# Patient Record
Sex: MALE | Race: WHITE | URBAN - METROPOLITAN AREA
[De-identification: names, ages, dates, MRNs, and addresses within clinical notes are randomized per-mention and may not be internally consistent; named-entity substitution may affect disease eponyms.]

---

## 2021-08-03 ENCOUNTER — LAB ENCOUNTER (OUTPATIENT)
Dept: LAB | Age: 36
End: 2021-08-03
Attending: INTERNAL MEDICINE
Payer: COMMERCIAL

## 2021-08-03 ENCOUNTER — OFFICE VISIT (OUTPATIENT)
Dept: INTERNAL MEDICINE CLINIC | Facility: CLINIC | Age: 36
End: 2021-08-03
Payer: COMMERCIAL

## 2021-08-03 VITALS
HEART RATE: 68 BPM | HEIGHT: 75.5 IN | TEMPERATURE: 99 F | BODY MASS INDEX: 30.02 KG/M2 | WEIGHT: 244 LBS | SYSTOLIC BLOOD PRESSURE: 118 MMHG | OXYGEN SATURATION: 98 % | DIASTOLIC BLOOD PRESSURE: 86 MMHG

## 2021-08-03 DIAGNOSIS — Z30.09 VASECTOMY EVALUATION: ICD-10-CM

## 2021-08-03 DIAGNOSIS — Z00.00 PHYSICAL EXAM: ICD-10-CM

## 2021-08-03 DIAGNOSIS — Z00.00 PHYSICAL EXAM: Primary | ICD-10-CM

## 2021-08-03 LAB
ALBUMIN SERPL-MCNC: 4.1 G/DL (ref 3.4–5)
ALBUMIN/GLOB SERPL: 1.3 {RATIO} (ref 1–2)
ALP LIVER SERPL-CCNC: 67 U/L
ALT SERPL-CCNC: 30 U/L
ANION GAP SERPL CALC-SCNC: 10 MMOL/L (ref 0–18)
AST SERPL-CCNC: 17 U/L (ref 15–37)
BASOPHILS # BLD AUTO: 0.06 X10(3) UL (ref 0–0.2)
BASOPHILS NFR BLD AUTO: 0.8 %
BILIRUB SERPL-MCNC: 0.8 MG/DL (ref 0.1–2)
BILIRUB UR QL: NEGATIVE
BUN BLD-MCNC: 19 MG/DL (ref 7–18)
BUN/CREAT SERPL: 22.4 (ref 10–20)
CALCIUM BLD-MCNC: 9.6 MG/DL (ref 8.5–10.1)
CHLORIDE SERPL-SCNC: 105 MMOL/L (ref 98–112)
CHOLEST SMN-MCNC: 153 MG/DL (ref ?–200)
CLARITY UR: CLEAR
CO2 SERPL-SCNC: 25 MMOL/L (ref 21–32)
COLOR UR: YELLOW
CREAT BLD-MCNC: 0.85 MG/DL
DEPRECATED RDW RBC AUTO: 38.3 FL (ref 35.1–46.3)
EOSINOPHIL # BLD AUTO: 0.18 X10(3) UL (ref 0–0.7)
EOSINOPHIL NFR BLD AUTO: 2.5 %
ERYTHROCYTE [DISTWIDTH] IN BLOOD BY AUTOMATED COUNT: 11.9 % (ref 11–15)
GLOBULIN PLAS-MCNC: 3.2 G/DL (ref 2.8–4.4)
GLUCOSE BLD-MCNC: 84 MG/DL (ref 70–99)
GLUCOSE UR-MCNC: NEGATIVE MG/DL
HCT VFR BLD AUTO: 46 %
HDLC SERPL-MCNC: 57 MG/DL (ref 40–59)
HGB BLD-MCNC: 15.6 G/DL
IMM GRANULOCYTES # BLD AUTO: 0.05 X10(3) UL (ref 0–1)
IMM GRANULOCYTES NFR BLD: 0.7 %
KETONES UR-MCNC: NEGATIVE MG/DL
LDLC SERPL CALC-MCNC: 83 MG/DL (ref ?–100)
LEUKOCYTE ESTERASE UR QL STRIP.AUTO: NEGATIVE
LYMPHOCYTES # BLD AUTO: 1.78 X10(3) UL (ref 1–4)
LYMPHOCYTES NFR BLD AUTO: 24.4 %
M PROTEIN MFR SERPL ELPH: 7.3 G/DL (ref 6.4–8.2)
MCH RBC QN AUTO: 29.8 PG (ref 26–34)
MCHC RBC AUTO-ENTMCNC: 33.9 G/DL (ref 31–37)
MCV RBC AUTO: 88 FL
MONOCYTES # BLD AUTO: 0.77 X10(3) UL (ref 0.1–1)
MONOCYTES NFR BLD AUTO: 10.6 %
NEUTROPHILS # BLD AUTO: 4.45 X10 (3) UL (ref 1.5–7.7)
NEUTROPHILS # BLD AUTO: 4.45 X10(3) UL (ref 1.5–7.7)
NEUTROPHILS NFR BLD AUTO: 61 %
NITRITE UR QL STRIP.AUTO: NEGATIVE
NONHDLC SERPL-MCNC: 96 MG/DL (ref ?–130)
OSMOLALITY SERPL CALC.SUM OF ELEC: 291 MOSM/KG (ref 275–295)
PATIENT FASTING Y/N/NP: YES
PATIENT FASTING Y/N/NP: YES
PH UR: 5 [PH] (ref 5–8)
PLATELET # BLD AUTO: 181 10(3)UL (ref 150–450)
POTASSIUM SERPL-SCNC: 4.3 MMOL/L (ref 3.5–5.1)
PROT UR-MCNC: NEGATIVE MG/DL
RBC # BLD AUTO: 5.23 X10(6)UL
SODIUM SERPL-SCNC: 140 MMOL/L (ref 136–145)
SP GR UR STRIP: 1.02 (ref 1–1.03)
TRIGL SERPL-MCNC: 63 MG/DL (ref 30–149)
TSI SER-ACNC: 1.62 MIU/ML (ref 0.36–3.74)
UROBILINOGEN UR STRIP-ACNC: <2
VIT D+METAB SERPL-MCNC: 70.8 NG/ML (ref 30–100)
VLDLC SERPL CALC-MCNC: 10 MG/DL (ref 0–30)
WBC # BLD AUTO: 7.3 X10(3) UL (ref 4–11)

## 2021-08-03 PROCEDURE — 36415 COLL VENOUS BLD VENIPUNCTURE: CPT

## 2021-08-03 PROCEDURE — 81001 URINALYSIS AUTO W/SCOPE: CPT | Performed by: INTERNAL MEDICINE

## 2021-08-03 PROCEDURE — 82306 VITAMIN D 25 HYDROXY: CPT

## 2021-08-03 PROCEDURE — 84402 ASSAY OF FREE TESTOSTERONE: CPT

## 2021-08-03 PROCEDURE — 85025 COMPLETE CBC W/AUTO DIFF WBC: CPT

## 2021-08-03 PROCEDURE — 3079F DIAST BP 80-89 MM HG: CPT | Performed by: INTERNAL MEDICINE

## 2021-08-03 PROCEDURE — 84403 ASSAY OF TOTAL TESTOSTERONE: CPT

## 2021-08-03 PROCEDURE — 80053 COMPREHEN METABOLIC PANEL: CPT

## 2021-08-03 PROCEDURE — 99385 PREV VISIT NEW AGE 18-39: CPT | Performed by: INTERNAL MEDICINE

## 2021-08-03 PROCEDURE — 3008F BODY MASS INDEX DOCD: CPT | Performed by: INTERNAL MEDICINE

## 2021-08-03 PROCEDURE — 80061 LIPID PANEL: CPT

## 2021-08-03 PROCEDURE — 84443 ASSAY THYROID STIM HORMONE: CPT

## 2021-08-03 PROCEDURE — 3074F SYST BP LT 130 MM HG: CPT | Performed by: INTERNAL MEDICINE

## 2021-08-03 NOTE — PROGRESS NOTES
Allie Lutz is a 43year old male who presents for a complete physical exam.   HPI:   Pt complains of:    Patient presents with:  Physical: Pt in good shape and doing 25min cardio with 1 hr of weight 3-4 times per week, eating habits have dropped off. weakness. negative for joint pain  Hema/Lymph: Negative Easy bleeding and easy bruising. Allergic/Immuno: Negative Environmental allergies and food allergies.     EXAM:   /86   Pulse 68   Temp 98.6 °F (37 °C) (Oral)   Ht 6' 3.5\" (1.918 m)   Wt 244 l exercise, review of available labs and radiology reports, and completing documentation.     Rachel Cardona DO  8/3/2021  3:48 PM

## 2021-08-03 NOTE — PATIENT INSTRUCTIONS
1. Physical exam  Physical exam instruction: Improve diet and exercise, complete fasting labs in the near future and you will be called with results 5-7 days after completed, call with questions.   Call the central scheduling number at [phone] to sched

## 2021-08-07 LAB
TESTOSTERONE, FREE, S: 9.77 NG/DL
TESTOSTERONE, TOTAL, S: 315 NG/DL

## 2021-08-10 ENCOUNTER — PATIENT MESSAGE (OUTPATIENT)
Dept: INTERNAL MEDICINE CLINIC | Facility: CLINIC | Age: 36
End: 2021-08-10

## 2021-08-10 DIAGNOSIS — R79.89 LOW TESTOSTERONE: Primary | ICD-10-CM

## 2021-08-11 NOTE — TELEPHONE ENCOUNTER
From: Caryn Villatoro  To: Dorinda Mcmahan DO  Sent: 8/10/2021 6:03 PM CDT  Subject: Test Results Question    Thanks for getting back to me. After receiving the results I do have some concerns regarding the testosterone level. Doing some research.  [facility]

## 2021-09-23 ENCOUNTER — PATIENT MESSAGE (OUTPATIENT)
Dept: SURGERY | Facility: CLINIC | Age: 36
End: 2021-09-23

## 2021-09-24 NOTE — TELEPHONE ENCOUNTER
Sent patient [facility] message. Spoke with Ct Tee, she said as of now there is openings in November.      Future Appointments   Date Time Provider Jaime Wan   9/29/2021  1:00 PM Laina Gamble MD [facility]

## 2021-09-24 NOTE — TELEPHONE ENCOUNTER
From: Chad Lazcano  To: Marissa Florez MD  Sent: 9/23/2021 12:32 PM CDT  Subject: Vasectomy    Hello. I have my consultation scheduled next week.  I was hoping to find out if there will be availability for me to have the actual vasectomy in November as

## 2021-09-29 ENCOUNTER — OFFICE VISIT (OUTPATIENT)
Dept: SURGERY | Facility: CLINIC | Age: 36
End: 2021-09-29
Payer: COMMERCIAL

## 2021-09-29 VITALS
WEIGHT: 257 LBS | HEART RATE: 58 BPM | SYSTOLIC BLOOD PRESSURE: 104 MMHG | HEIGHT: 75.5 IN | DIASTOLIC BLOOD PRESSURE: 62 MMHG | BODY MASS INDEX: 31.62 KG/M2

## 2021-09-29 DIAGNOSIS — R79.89 LOW TESTOSTERONE IN MALE: ICD-10-CM

## 2021-09-29 DIAGNOSIS — Z87.891 FORMER SMOKER: ICD-10-CM

## 2021-09-29 DIAGNOSIS — R35.0 URINARY FREQUENCY: ICD-10-CM

## 2021-09-29 DIAGNOSIS — Z30.09 VASECTOMY EVALUATION: Primary | ICD-10-CM

## 2021-09-29 PROCEDURE — 99244 OFF/OP CNSLTJ NEW/EST MOD 40: CPT | Performed by: UROLOGY

## 2021-09-29 PROCEDURE — 3008F BODY MASS INDEX DOCD: CPT | Performed by: UROLOGY

## 2021-09-29 PROCEDURE — 3074F SYST BP LT 130 MM HG: CPT | Performed by: UROLOGY

## 2021-09-29 PROCEDURE — 3078F DIAST BP <80 MM HG: CPT | Performed by: UROLOGY

## 2021-09-29 RX ORDER — DIAZEPAM 10 MG/1
TABLET ORAL
Qty: 1 TABLET | Refills: 0 | Status: SHIPPED | OUTPATIENT
Start: 2021-09-29

## 2021-09-29 RX ORDER — DOXYCYCLINE 100 MG/1
100 CAPSULE ORAL 2 TIMES DAILY
Qty: 10 CAPSULE | Refills: 1 | Status: SHIPPED | OUTPATIENT
Start: 2021-09-29

## 2021-09-29 RX ORDER — HYDROCODONE BITARTRATE AND ACETAMINOPHEN 5; 325 MG/1; MG/1
TABLET ORAL
Qty: 7 TABLET | Refills: 0 | Status: SHIPPED | OUTPATIENT
Start: 2021-09-29

## 2021-09-29 NOTE — PROGRESS NOTES
Patient seen in office, scheduled to have voluntary bilateral vasectomy Friday 12/31/2021 in office.  Patient informed to arrive here at 7:30am.

## 2021-09-29 NOTE — PATIENT INSTRUCTIONS
Luis Collins M.D.     1.  Please continue  birth control precautions without exception, every time you have intercourse,  until further notice    2.   Proceed with plans for voluntary sterilization -- bilateral to either the hospital or the surgery center. If you are scheduled for the office, then it will be performed in [address] [place] [address] which is on the second floor of the [place] [address].   You must be on time for your vasectomy; if you are late, you will have

## 2021-09-29 NOTE — PROGRESS NOTES
Ashok Michael is a 43year old male. HPI:   Patient presents with:  Vasectomy: Pt here for vasectomy consult      History provided by Patient.  Referred by Dr. Joseph Patrick, PCP      [facility]                 Patient is 26 to  and his w 10 pack year history      Review of previous records:   Urological History--This is the patient's first time seeing a urologist     Smoking History & Fume Exposure--Patient smoked 20 years, 1/2 pack a day; 10 pack year history    Family History--Mother had pain, constipation, decreased appetite, diarrhea and vomiting    Neurological:  Negative for gait disturbance    Endocrine:  Negative for abnormal sleep patterns, increased activity, polydipsia and polyphagia    Allergic/Immuno:  Negative for environmental Hydrocele or spermatocele above right testicle 2.5 - 3 cm   LENGTH OF VAS DEFERENS   Left mildly longer than Average;  Right vas deferens and spermatic cord also mildly longer than average.   Each vas deferens somewhat more mobile to palpation than typical, understands, that there is a possibility that after bilateral vasectomy, a patient may not achieve a sperm count of less than 100,000 nonmotile sperm cells per mL, and that if that were to happen, and if it persisted for longer period of time, the patient possible complications including possible bleeding, clot, infection,  nerve, anesthesia complications, persistent post-operative pain, possible injury to organs, possible failure of surgery or of surgical instruments or devices, possible need for additiona his hydrocodone.              2)  Prostate cancer screening/PSA screening  --   I strongly recommend to patient that he start annual prostate cancer PSA screening at age 54, unless if there is a family history of prostate cancer at a younger age, upon which procedure and none for the first 2 days after the procedure; it is best that you remove these medications from your medicine cabinet 10 days before the procedure so that you do not accidentally take them.   It is okay to take Tylenol or acetaminophen, but n is simply \"not fair\" to others.     13  If you wish, I can perform the vasectomy either at the office, or [address] on the first floor of the [facility],   depending on which of the two  is better for you with respect to your insuranc ARRIVE AT THE OFFICE   for your procedure; you must have a . After the procedure, take 1 tablet every  6 hours for pain.        Imaging & Referrals:  None     By signing my name below, I, Nitesh Villela,  attest that this documentation has been prepar

## 2021-12-14 ENCOUNTER — TELEPHONE (OUTPATIENT)
Dept: SURGERY | Facility: CLINIC | Age: 36
End: 2021-12-14

## 2021-12-14 NOTE — TELEPHONE ENCOUNTER
Patient indicates he has procedure scheduled on 12/31 (indicates exam). Patient moved and would like to reschedule procedure. Please call at [phone],[facility].

## 2022-02-28 ENCOUNTER — TELEPHONE (OUTPATIENT)
Dept: SURGERY | Facility: CLINIC | Age: 37
End: 2022-02-28

## 2022-02-28 RX ORDER — HYDROCODONE BITARTRATE AND ACETAMINOPHEN 5; 325 MG/1; MG/1
TABLET ORAL
Qty: 7 TABLET | Refills: 0 | Status: SHIPPED | OUTPATIENT
Start: 2022-02-28

## 2022-02-28 NOTE — TELEPHONE ENCOUNTER
Per pt he is coming in for surgery and the prescription for Norco . Per pt requesting a new prescription.  Please advise

## 2022-02-28 NOTE — TELEPHONE ENCOUNTER
Spoke with pt and notified him that norco has been sent. Pt states the other 2 prescriptions were still valid. Went over pre-procedure instructions agains with pt. Instructed pt to come in at 0730 Friday morning. Verbalized understanding.

## 2022-03-04 ENCOUNTER — PROCEDURE (OUTPATIENT)
Dept: SURGERY | Facility: CLINIC | Age: 37
End: 2022-03-04
Payer: COMMERCIAL

## 2022-03-04 VITALS
BODY MASS INDEX: 31.62 KG/M2 | DIASTOLIC BLOOD PRESSURE: 77 MMHG | HEIGHT: 75.5 IN | RESPIRATION RATE: 16 BRPM | WEIGHT: 257 LBS | SYSTOLIC BLOOD PRESSURE: 117 MMHG | HEART RATE: 64 BPM

## 2022-03-04 DIAGNOSIS — A63.0 CONDYLOMA ACUMINATUM OF PENIS: ICD-10-CM

## 2022-03-04 DIAGNOSIS — Z30.8 POSTVASECTOMY SPERM COUNT: ICD-10-CM

## 2022-03-04 DIAGNOSIS — Z30.2 ENCOUNTER FOR STERILIZATION: Primary | ICD-10-CM

## 2022-03-04 PROCEDURE — 55250 REMOVAL OF SPERM DUCT(S): CPT | Performed by: UROLOGY

## 2022-03-04 PROCEDURE — 99212 OFFICE O/P EST SF 10 MIN: CPT | Performed by: UROLOGY

## 2022-03-04 PROCEDURE — 99070 SPECIAL SUPPLIES PHYS/QHP: CPT | Performed by: UROLOGY

## 2022-03-04 PROCEDURE — 3008F BODY MASS INDEX DOCD: CPT | Performed by: UROLOGY

## 2022-03-04 PROCEDURE — 3078F DIAST BP <80 MM HG: CPT | Performed by: UROLOGY

## 2022-03-04 PROCEDURE — 3074F SYST BP LT 130 MM HG: CPT | Performed by: UROLOGY

## 2022-03-04 NOTE — PATIENT INSTRUCTIONS
Imani Hernandez M.D.     1.   Please continue birth control precautions until further notice. 2.   Apply cold pack to operative area intermittently today, 10-15 minutes on, 5 minutes off. 3.   Please finish your antibiotic as directed    4. For pain control in the first 36 - 48  hours, take either Tylenol or acetaminophen for mild or moderate pain or   Hydrocodone (generic Norco) for severe pain as directed    5. After 2 days, instead of the pain relievers noted above,  you may take NSAIDs such as ibuprofen, Motrin, Advil, or Aleve as directed on bottle    6. Please follow my postoperative instructions on my vasectomy handout    7. Please make appointment to see our urology nurse practitioner EDENILSON Ham in 6 weeks. .  Please submit to the laboratory postvasectomy semen analysis a few days before the visit.     8.  Please schedule     BIOPSY AND CO2 LASER ABLATION OF MULTIPLE CONDYLOMA PENIS--AT SURGERY CENTER; LOCAL ANESTHESIA    Please hold aspirin and NSAIDs (such as Advil, Motrin, Aleve, ibuprofen) for 7 days before procedure    You may eat or drink before this particular procedure because we are not putting you to sleep    I recommend that you use a condom at all times when being sexually active; I also recommend that you not partake in oral sexual activity because of these lesions are condyloma, they can cause cancer of the mouth

## 2022-03-05 NOTE — PROGRESS NOTES
UROLOGY PROCEDURE NOTE      PREOPERATIVE DIAGNOSIS:          Desires voluntary sterilization - bilateral vasectomy. POSTOPERATIVE DIAGNOSIS:         Same. PROCEDURE PERFORMED: Voluntary sterilization - bilateral vasectomy. ANESTHESIA:   Diazepam 10 mg and one tablet  hydrocodone 5/325 mg  orally                           and 2% Xylocaine injectable. INDICATIONS:   Before surgery today, I once again discussed  with the patient the following issues, complications, side effects of vasectomy: possible failure of the procedure with possibility that patient could still end up making his wife pregnant, despite undergoing this procedure;   bleeding complications; wound infection; pain that might never go away; the fact that he is still fertile immediately after procedure; that if he changes his mind, a reversal operation may not  be successful; as well as other side effects and complications and the patient understands and still wishes to proceed and  feels comfortable with his decision. DESCRIPTION OF PROCEDURE:   The patient was given the anesthesia as indicated above. The patient was prepped and draped in usual sterile fashion. The left vas deferens was isolated under the scrotal skin. The skin over it was infiltrated with 2% Plain Xylocaine. A small 7-10 mm opening was made in the skin over the vas; the vas was delivered out of the wound; it was skeletonized and exposed for a 3-4 cm segment. Hemostasis was controlled by electrocoagulation. A 1 cm segment was excised. Two medium clips were placed underneath the   proximal end  (coming off of the epididymis); 2 clips were placed on the craniad end of the vas deferens, further up craniad from the transected end; that transected end was fulgurated; then the craniad end of the vas deferens was   folded back upon itself, and another medium clip was placed securing it in that configuration. .  The ends were then allowed to fall back into the wound. The exact same procedure was performed on the contralateral side through the same skin opening. At the end of the case, the wound was closed using a suture of 3-0 chromic followed by antibiotic ointment, a small Telfa dressing, then a small 2 x 2 sterile dressing, then Tegaderm dressing. Each segment of vas deferens was sent separately in formalin to pathology for identification. The patient tolerated the procedure well. DISCUSSION WITH PATIENT CONCERNING CONDYLOMA DISCOVERED TODAY ON PHYSICAL EXAM BEFORE THE BILATERAL VASECTOMY    Before taking patient to the office operative room, I examined patient. Identified for condyloma appearing lesions on the penis with the largest being at least 1 cm in size. I splinted patient the benefits and risks and alternatives to planning for biopsy and CO2 laser ablation of these lesions at the surgery center in the near future and I answered patient's questions on treatment and he understands and wants to proceed. I directed my staff to schedule the procedure for the patient. I also gave patient preoperative instructions. The discussion concerning treatment of his condyloma was separate from the vasectomy and separate from it and I spent 12 minutes on this matter .     Dr. Sheri Mg

## 2022-03-07 ENCOUNTER — TELEPHONE (OUTPATIENT)
Dept: SURGERY | Facility: CLINIC | Age: 37
End: 2022-03-07

## 2022-03-07 NOTE — TELEPHONE ENCOUNTER
Spoke with patient, tentatively scheduled biopsy and c02 laser ablation of condyloma, Thursday 03/31/2022, [facility] outpatient surgery center, went over pre-op instructions, informed that surgery center will call with exact arrival time.

## 2022-03-18 ENCOUNTER — TELEPHONE (OUTPATIENT)
Dept: SURGERY | Facility: CLINIC | Age: 37
End: 2022-03-18

## 2022-03-18 NOTE — TELEPHONE ENCOUNTER
Spoke with patient, due to equipment issues, surgery will be moved to [facility]/outpatient Thursday 03/31/2022. Pre-op instructions sent to his My chart, patient will review instructions, if questions arise patient informed to call, please transfer to [phone].

## 2022-03-29 RX ORDER — MULTIVIT-MIN/IRON FUM/FOLIC AC 7.5 MG-4
1 TABLET ORAL DAILY
COMMUNITY

## 2022-03-31 ENCOUNTER — HOSPITAL ENCOUNTER (OUTPATIENT)
Facility: HOSPITAL | Age: 37
Setting detail: HOSPITAL OUTPATIENT SURGERY
Discharge: HOME | End: 2022-03-31
Attending: UROLOGY | Admitting: UROLOGY
Payer: COMMERCIAL

## 2022-03-31 VITALS
HEART RATE: 59 BPM | HEIGHT: 75.5 IN | SYSTOLIC BLOOD PRESSURE: 125 MMHG | RESPIRATION RATE: 18 BRPM | DIASTOLIC BLOOD PRESSURE: 72 MMHG | WEIGHT: 254 LBS | BODY MASS INDEX: 31.25 KG/M2 | OXYGEN SATURATION: 100 % | TEMPERATURE: 98 F

## 2022-03-31 DIAGNOSIS — Z01.818 PRE-OP TESTING: Primary | ICD-10-CM

## 2022-03-31 DIAGNOSIS — N48.9 PENILE LESION: ICD-10-CM

## 2022-03-31 PROCEDURE — 0VBSXZZ EXCISION OF PENIS, EXTERNAL APPROACH: ICD-10-PCS | Performed by: UROLOGY

## 2022-03-31 PROCEDURE — 88305 TISSUE EXAM BY PATHOLOGIST: CPT | Performed by: UROLOGY

## 2022-03-31 PROCEDURE — 0V5SXZZ DESTRUCTION OF PENIS, EXTERNAL APPROACH: ICD-10-PCS | Performed by: UROLOGY

## 2022-03-31 RX ORDER — LIDOCAINE HYDROCHLORIDE 20 MG/ML
INJECTION, SOLUTION EPIDURAL; INFILTRATION; INTRACAUDAL; PERINEURAL AS NEEDED
Status: DISCONTINUED | OUTPATIENT
Start: 2022-03-31 | End: 2022-03-31 | Stop reason: HOSPADM

## 2022-03-31 NOTE — INTERVAL H&P NOTE
Pre-op Diagnosis: Penile lesion [N48.9]    The above referenced H&P was reviewed by Didi Huitron MD on 3/31/2022, the patient was examined and no significant changes have occurred in the patient's condition since the H&P was performed. I discussed with the patient   the potential benefits, risks and side effects of this procedure; the likelihood of the patient achieving goals; and potential problems that might occur during recuperation. I discussed reasonable alternatives to the procedure, including risks, benefits and side effects related to the alternatives and risks related to not receiving this procedure. I answered patient's questions on surgery and on treatment options and he understands wants to proceed with surgery as scheduled. We will proceed with procedure as planned.

## 2022-04-01 NOTE — OPERATIVE REPORT
[facility]    PATIENT'S NAME: Jeanine Briggs   ATTENDING PHYSICIAN: Gabby Rizo MD   OPERATING PHYSICIAN: Gabby Rizo MD   PATIENT ACCOUNT#:   [id number]    LOCATION:  [place] [address]  MEDICAL RECORD #:   [id number]       YOB: 1985  ADMISSION DATE:       03/31/2022      OPERATION DATE:  03/31/2022    OPERATIVE REPORT    (Corrected report, 04/01/2022, addendum added)    PREOPERATIVE DIAGNOSIS:  Multiple condyloma of penis ranging from 6 to 20 mm in size. POSTOPERATIVE DIAGNOSIS:  Multiple condyloma of penis ranging from 6 to 20 mm in size. PROCEDURE:  Biopsy and CO2 laser ablation of multiple condyloma of penis. ASSISTANT:  None. ANESTHESIA:  Local, namely 1 mL of 2% plain Xylocaine. COMPLICATIONS:  None. ESTIMATED BLOOD LOSS:  Zero. FINDINGS:  There are 6 condyloma lesions on the penis and the smallest is 6 mm and the largest is 2 cm. One of the lesions is sharply excised, and the others are destroyed with CO2 laser ablation. They were all located on the shaft of the penis on the lateral aspects and ventral aspects of the penis. RECOMMENDATIONS:  Await the results of the pathology. Instructions for postoperative care were given to the patient. OPERATIVE TECHNIQUE:  Patient was taken to the operating room, placed in a supine position, prepped and draped in usual sterile fashion. I infiltrated all of the lesions with 2% Xylocaine, and total amount used was 1 mL. One of the lesions is sharply excised and then performed hemostasis by CO2 laser coagulation of the base. The other lesions were all ablated with the CO2 laser. The settings on the CO2 laser were 3 gomez and 5 gomez continuous. After treating each lesion, I then wiped away the eschar and treated the lesion again. All visible lesions were treated. At the end of the procedure, I applied bacitracin ointment to each of the treated lesions.   This procedure is considered to be extensive ablation because there were multiple lesions, 6 of them, and they were large as described above. ADDENDUM ([id number]): I would like to note that the patient underwent an office bilateral vasectomy on 03/04/2022, and these lesions were present before the vasectomy and were not the result of and no way related to the bilateral vasectomy that he underwent.     Dictated By Yo Hamilton MD  d: 03/31/2022 13:28:44  t: 03/31/2022 20:57:43  [facility] [id number]  [facility]/

## 2022-04-27 ENCOUNTER — LAB ENCOUNTER (OUTPATIENT)
Dept: LAB | Facility: HOSPITAL | Age: 37
End: 2022-04-27
Attending: NURSE PRACTITIONER
Payer: COMMERCIAL

## 2022-04-27 ENCOUNTER — OFFICE VISIT (OUTPATIENT)
Dept: SURGERY | Facility: CLINIC | Age: 37
End: 2022-04-27
Payer: COMMERCIAL

## 2022-04-27 DIAGNOSIS — A63.0 CONDYLOMA ACUMINATUM OF PENIS: ICD-10-CM

## 2022-04-27 DIAGNOSIS — Z98.52 S/P VASECTOMY: ICD-10-CM

## 2022-04-27 DIAGNOSIS — Z30.2 STERILIZATION: ICD-10-CM

## 2022-04-27 DIAGNOSIS — Z30.2 STERILIZATION: Primary | ICD-10-CM

## 2022-04-27 LAB
SPERM IMMOTILE # SMN: ABNORMAL SPERM/ML
SPERM MOTILE # SMN: 0 SPERM/ML
SPERM P VAS # SMN: ABNORMAL SPERM/ML (ref ?–0)

## 2022-04-27 PROCEDURE — 89321 SEMEN ANAL SPERM DETECTION: CPT

## 2024-03-21 NOTE — BRIEF OP NOTE
[facility] POST ANESTHESIA CARE UNIT  Brief Op Note       Patients Name: Dante Hartley. Attending Physician: Abdirashid Moody MD  Operating Physician: Rachel King MD  CSN: [id number]     Location:  OR  MRN: [id number]    YOB: 1985  Admission Date: 3/31/2022  Operation Date: 3/31/2022    Brief Operative Report    Pre-Operative Diagnosis: Multiple condyloma acuminatum of the penis    Post-Operative Diagnosis: Multiple condyloma acuminatum of the penis    Procedure Performed: BIOSPY AND C02 LASER CONDYLOMA OF PENIS ; multiple condyloma    Primary Surgeon:  Rachel King MD    Assistants: None    Anesthesia: Local  No anesthesia staff entered. Findings: Multiple, namely 6 condyloma, most greater than 6 mm with the largest being 2 cm--extensive    EBL: 0 mL    Complications: None    Specimens:    ID Type Source Tests Collected by Time Destination   1 : 1.  Penis lesion Tissue Penis SURGICAL PATHOLOGY TISSUE Abdirashid Moody MD 3/31/2022 12:50 PM        Condition: Stable      Rachel King MD  3/31/2022  1:24 PM
Yes

## (undated) DIAGNOSIS — N48.9 PENILE LESION: Primary | ICD-10-CM

## (undated) DEVICE — SUTURE PDS II 2-0 CT-2

## (undated) DEVICE — SMOKE EVACUATION TUBING 7/8 IN (22 MM) X 10 FT (3.1 M) TUBE WITH 8 IN (20 CM) INTEGRAL WAND & SPONGE GUARD: Brand: CONMED BUFFALO FILTER

## (undated) DEVICE — COTTON BALLS: Brand: DEROYAL

## (undated) DEVICE — UNDERGLOVE INDICAT GRN  7.5

## (undated) DEVICE — OUTPATIENT: Brand: MEDLINE INDUSTRIES, INC.

## (undated) DEVICE — COVER SGL STRL LGHT HNDL BLU

## (undated) DEVICE — TONGUE DEPRESSOR 6IN STR

## (undated) DEVICE — TECH FEE

## (undated) DEVICE — MEGADYNE ELECTRODE ADULT PT RT

## (undated) DEVICE — TRAY SKIN PREP PVP-1

## (undated) DEVICE — DRAPE SHEET TRANSVERSE LAP

## (undated) DEVICE — DRAPE 96X9IN CAM LSR ARM EQP

## (undated) DEVICE — GAMMEX® PI HYBRID SIZE 8, STERILE POWDER-FREE SURGICAL GLOVE, POLYISOPRENE AND NEOPRENE BLEND: Brand: GAMMEX

## (undated) DEVICE — SOL  .9 1000ML BTL

## (undated) NOTE — LETTER
Rebekah Zuniga Do  [address]  [facility]  [facility]       09/30/21        Patient: Liyah Andrade   YOB: 1985   Date of Visit: 9/29/2021       Dear  Dr. Diane Pratt DO,      Thank you for referring Liyah Andrade to options including long-term daily therapy with an alpha-blocker such as alfuzosin/Uroxatral.  Patient understands and decides against taking such daily medication      (Z87.891) Former smoker  Patient smoked 20 years, 1/2 pack a day; 10 pack year history. procedure, and must have a ; postoperatively, one tablet p.o. q.6 hours p.r.n. pain, especially for the first 2 days after the procedure    7.   Please do not drink any caffeinated beverages on the morning of the procedure; you may have them after the